# Patient Record
(demographics unavailable — no encounter records)

---

## 2024-12-02 NOTE — HISTORY OF PRESENT ILLNESS
[___ Week(s)] : [unfilled] week(s) [de-identified] : Fatigue, poor concentration, poor appetite, moodiness [FreeTextEntry6] : Patient and mother seeking answers as to why he feels weak and tired all the time and is frequently ernst and poorly focused. He is not currently taking any medication other than antibiotic for acne

## 2024-12-02 NOTE — HISTORY OF PRESENT ILLNESS
[___ Week(s)] : [unfilled] week(s) [de-identified] : Fatigue, poor concentration, poor appetite, moodiness [FreeTextEntry6] : Patient and mother seeking answers as to why he feels weak and tired all the time and is frequently ernst and poorly focused. He is not currently taking any medication other than antibiotic for acne

## 2024-12-02 NOTE — DISCUSSION/SUMMARY
[FreeTextEntry1] : Moodiness and fatigue Seeking hormonal or chemical explanation for symptoms  Labs: CBD, CMP, thyroid function, cortisol, insulin and hgb A1c, testosterone

## 2024-12-02 NOTE — REVIEW OF SYSTEMS
[Weakness] : weakness [Decr. Concentrating Ability] : decreased concentrating ability [Negative] : Genitourinary [FreeTextEntry2] : fatigue

## 2025-04-11 NOTE — REVIEW OF SYSTEMS
[Headache] : headache [Nasal Congestion] : nasal congestion [Sinus Pressure] : sinus pressure [Sore Throat] : sore throat [Cough] : cough [Tender Lymph Nodes] : tender lymph nodes [Negative] : Genitourinary

## 2025-04-11 NOTE — DISCUSSION/SUMMARY
[FreeTextEntry1] : Rapid strep test negative Rapid flu test negative Covid-19 antigen screen negative Allergic rhinitis   Daily antihistamine

## 2025-04-11 NOTE — PHYSICAL EXAM
[Mucoid Discharge] : mucoid discharge [Hypertrophied Nasal Mucosa] : hypertrophied nasal mucosa [Erythematous Oropharynx] : erythematous oropharynx [Tender] : tender [Anterior Cervical] : anterior cervical [NL] : warm, clear [Enlarged Tonsils] : tonsils not enlarged [Exudate] : no exudate [Palate petechiae] : palate without petechiae

## 2025-07-12 NOTE — PHYSICAL EXAM

## 2025-07-12 NOTE — HISTORY OF PRESENT ILLNESS
[Yes] : Patient goes to dentist yearly [Needs Immunizations] : Needs immunizations [Eats meals with family] : eats meals with family [Has family members/adults to turn to for help] : has family members/adults to turn to for help [Is permitted and is able to make independent decisions] : Is permitted and is able to make independent decisions [Sleep Concerns] : no sleep concerns [Eats regular meals including adequate fruits and vegetables] : eats regular meals including adequate fruits and vegetables [Drinks non-sweetened liquids] : drinks non-sweetened liquids  [Calcium source] : calcium source [Has concerns about body or appearance] : does not have concerns about body or appearance [Has friends] : has friends [Uses electronic nicotine delivery system] : does not use electronic nicotine delivery system [Exposure to electronic nicotine delivery system] : no exposure to electronic nicotine delivery system [Uses tobacco] : does not use tobacco [Exposure to tobacco] : no exposure to tobacco [Uses drugs] : does not use drugs  [Exposure to drugs] : no exposure to drugs [Drinks alcohol] : does not drink alcohol [Exposure to alcohol] : no exposure to alcohol [Uses safety belts/safety equipment] : uses safety belts/safety equipment  [Impaired/distracted driving] : no impaired/distracted driving [Has peer relationships free of violence] : has peer relationships free of violence [No] : Patient has not had sexual intercourse [Has ways to cope with stress] : has ways to cope with stress [Displays self-confidence] : displays self-confidence [Has problems with sleep] : does not have problems with sleep [Gets depressed, anxious, or irritable/has mood swings] : does not get depressed, anxious, or irritable/has mood swings [Has thought about hurting self or considered suicide] : has not thought about hurting self or considered suicide [de-identified] : College Graduate  [de-identified] : 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

## 2025-07-12 NOTE — DISCUSSION/SUMMARY
[Met privately with the adolescent for part of the office visit?] : Met privately with the adolescent for part of the office visit? Yes [Adolescent demonstrates understanding of his/her conditions and how to take prescribed medications?] : Adolescent demonstrates understanding of his/her conditions and how to take prescribed medications? Yes [Adolescent asks questions during each office  visit and participates in the care plan?] : Adolescent asks questions during each office visit and participates in the care plan? Yes [Adolescent is competent in independently making appointments, filling prescriptions, following up on referrals, and seeking emergency services, as needed?] : Adolescent is competent in independently making appointments, filling prescriptions, following up on referrals, and seeking emergency services, as needed? Yes [Adolescent's caregivers were provided with the opportunity to discuss their concerns about transferring decision making responsibility to the adolescent?] : Adolescent's caregivers were provided with the opportunity to discuss their concerns about transferring decision making responsibility to the adolescent? Yes [Discussed using [facility] to access health records and communicate with the adolescent's care team?] : Discussed using [facility] to access health records and communicate with the adolescent's care team? Yes [Initiated discussion about transfer to an adult healthcare provider?] : Initiated discussion about transfer to an adult healthcare provider? Yes  [Discussed choices for adult care and assist in identifying possible care providers?] : Discussed choices for adult care and assist in identifying possible care providers? No [Initiated communication with the adult provider that the family and adolescent has selected?] : Initiated communication with the adult provider that the family and adolescent has selected? No [FreeTextEntry1] : Patient declines booster dose of Meningitis B vaccine today. He will return in the future for vaccination update